# Patient Record
(demographics unavailable — no encounter records)

---

## 2025-01-28 NOTE — HISTORY OF PRESENT ILLNESS
[FreeTextEntry1] : Annual, [place]  Menses reg, thinking of starting a family in May. Discussed PNV, avoiding toxic habits.  Strong FH of ovarian and breast ca. Mother dx at 40 with recurrence. Mammo/sono ordered. Genetic testing ordered

## 2025-01-28 NOTE — PHYSICAL EXAM
[Chaperone Present] : A chaperone was present in the examining room during all aspects of the physical examination [[id number]] : A chaperone was present during the pelvic exam. [Appropriately responsive] : appropriately responsive [Alert] : alert [No Acute Distress] : no acute distress [No Lymphadenopathy] : no lymphadenopathy [Soft] : soft [Non-tender] : non-tender [Non-distended] : non-distended [No HSM] : No HSM [No Lesions] : no lesions [No Mass] : no mass [Oriented x3] : oriented x3 [Examination Of The Breasts] : a normal appearance [No Masses] : no breast masses were palpable [Labia Majora] : normal [Labia Minora] : normal [Normal] : normal [Uterine Adnexae] : normal

## 2025-07-16 NOTE — ASSESSMENT
[FreeTextEntry1] : 32yo F with PMHx of HLD, enlarged thyroid presents to the office for fertility concerns.   #URI - likely viral - supportive care - tea, honey, lozenges  #Fertility planning - Patient has been trying to conceive since 5/2025 - 3 months - Continue prenatal vitamins - Counseled on stress management, sleep hygiene  - Diet: Encouraged a balanced diet with portion control, emphasizing whole foods, lean proteins, fruits, and vegetables while minimizing processed foods. - Exercise: Recommended at least 150 minutes of moderate intensity aerobic exercise per week, along with strength training exercises at least twice weekly - Recommend follow up with GYN for fertility planning, genetic screening given GYN CA FHx   #Palpitations - Patient with occasional palpitations, especially with exertion - VS stable - EKG today with SR, vent rate 77 bpm, no acute ischemic changes  - Will check TSH, free T4 today - further workup pending results  - Counseled patient on hydration, balanced diet, regular exercise as tolerated  - Recommend seeing Cardiology to rule out cardiac etiology   - Follow up in 1 month for CPE  #h/o enlarged Thyroid - Will do TFTs

## 2025-07-16 NOTE — PHYSICAL EXAM
[TextEntry] : GENERAL: patient appears well-developed, well-groomed, well-nourished; no acute distress; appropriately interactive HEAD: normocephalic, atraumatic EYES: sclera clear and anicteric, no exudates, PERRLA ENMT: + mild oropharynx erythema, no exudates, moist mucous membranes; tympanic membrane non-bulging, non-erythematous, canal clear NECK: supple, soft, no lymphadenopathy, + thyroid enlarged   LUNGS: good air entry bilaterally, clear to auscultation, symmetric breath sounds, no wheezing, crackles, or rhonchi appreciated HEART: S1/S2 present, regular rate and rhythm, no murmurs noted, no lower extremity edema, 2+ distal pulses in upper and lower extremities bilaterally GASTROINTESTINAL: abdomen is soft, non-tender, non-distended, normoactive bowel sounds INTEGUMENT: good skin turgor, warm skin, appears well perfused, no lesions, no rashes MUSCULOSKELETAL: no clubbing or cyanosis, no obvious deformity NEUROLOGIC: awake, alert, oriented x3, good muscle tone in all 4 extremities, 5/5 motor strength in upper and lower extremities bilaterally, 5/5  strength, sensation intact in upper and lower extremities bilaterally PSYCHIATRIC: affect appropriate, mood congruent, thought process logical and linear HEME/LYMPH: no obvious ecchymosis or petechiae

## 2025-07-16 NOTE — HISTORY OF PRESENT ILLNESS
[de-identified] : 32yo F with PMHx of HLD, enlarged thyroid presents to the office for an Annual Physical Exam. Patient states she got  in 5/2025 and has been trying to get pregnant since; no pregnancy yet. Denies any Hx of prior pregnancies or miscarriages. Patient was previously on OCPs and discontinued in 2020. Reports regular intercourse with partner. Has been eating balanced diet. Does pilates/yoga/cycling about 2-3x a week. Patient has been stressed recently with her wedding, travel, and her mom's breast CA. She reports occasional fatigue even with consistent sleep; has trouble falling asleep; she did not end up getting sleep study, since she got tracker device [employer], feels like sleep has somewhat improved. Patient last saw her OBGYN Dr. Cummings in 1/2025; no acute concerns at that appointment. Patient has FHx of breast CA (mother diagnosed at 39yo, s/p RXT, lumpectomy x3; currently stage IV after tumor recurrence, now on chemo; living, 56yo), non-Hodgkins lymphoma (father diagnosed in his early 60s; living, 70yo); sister (living, 29yo) - healthy; maternal grandmother  (living, 81yo) - ovarian, CA, breast CA. She tried to schedule genetic testing but the practice stated they do not perform this type of genetic testing. Patient denies any known Hx of fertility issues. Patient reports Hx of enlarged thyroid; has had ultrasound in the past, all wnl per patient. Endorses occasional palpitations, unrelated to activity. Drinks coffee in the morning, 1 shot of espresso daily. Denies any known Hx of autoimmune conditions.   Patient feels like she is having a cold now; experiencing cold symptoms, including nasal congestion, rhinorrhea, cough for 4-5 days; sore throat a few days ago, but now resolved. Patient states her  had similar symptoms, which have now resolved. Patient states she felt worse this morning; has been hydrating and trying to eat. Denies fevers, chills, sweating, unintentional weight loss, loss of appetite, n/v/diarrhea. Recently travelled to [place], came home 6 days ago.   No other complaints at this time.   PMHx:   Surgical Hx:   Medications:   Allergies:   Specialists:   FHx:   SOCIAL Hx: Diet: Exercise: Tobacco: Alcohol: Recreational Drugs: Caffeine: Water intake: Sexually active: Hx of STIs? Occupation: Stressors:   HEALTHCARE MAINTENANCE: Last CPE:   Mammography: Pap Smear: LMP: Dexa:   Colonoscopy:   IMMUNIZATIONS: Flu: COVID-19: Pneumonia: Shingles: Tetanus:  [FreeTextEntry8] : 32yo F with PMHx of HLD, enlarged thyroid presents to the office for fertility concerns. Patient states she got  in 5/2025 and has been trying to get pregnant since; no pregnancy yet. Denies any Hx of prior pregnancies or miscarriages. Patient was previously on OCPs and discontinued in 2020. Reports regular intercourse with partner, regular periods, has been tracking ovulation with home kit and timing intercourse accordingly. Has been eating balanced diet. Does pilates/yoga/cycling about 2-3x a week. Patient has been stressed recently with her wedding, travel, and her mom's breast CA. She reports occasional fatigue even with consistent sleep; has trouble falling asleep; she did not end up getting sleep study, since she got tracker device [employer], feels like sleep has somewhat improved. Patient last saw her OBGYN Dr. Cummings in 1/2025; no acute concerns at that appointment. Patient has FHx of breast CA (mother diagnosed at 41yo, s/p RXT, lumpectomy x3; currently stage IV after tumor recurrence, now on chemo; living, 58yo), non-Hodgkins lymphoma (father diagnosed in his early 60s; living, 70yo); sister (living, 29yo) - healthy; maternal grandmother (living, 81yo) - ovarian, CA, breast CA. She tried to schedule genetic testing but the practice stated they do not perform this type of genetic testing. Patient denies any known Hx of fertility issues. Patient reports Hx of enlarged thyroid; has had ultrasound in the past, all wnl per patient. Endorses occasional palpitations, unrelated to activity. Drinks coffee in the morning, 1 shot of espresso daily. Denies any known Hx of autoimmune conditions.  Patient feels like she is having a cold now; experiencing cold symptoms, including nasal congestion, rhinorrhea, cough for 4-5 days; sore throat a few days ago, but now resolved. Patient states her  had similar symptoms, which have now resolved. Patient states she felt worse this morning; has been hydrating and trying to eat. Denies fevers, chills, sweating, unintentional weight loss, loss of appetite, n/v/diarrhea. Recently travelled to [place], came home 6 days ago.  No other complaints at this time.  22

## 2025-07-16 NOTE — ASSESSMENT
[FreeTextEntry1] : 34yo F with PMHx of HLD, enlarged thyroid presents to the office for fertility concerns.   #URI - likely viral - supportive care - tea, honey, lozenges  #Fertility planning - Patient has been trying to conceive since 5/2025 - 3 months - Continue prenatal vitamins - Counseled on stress management, sleep hygiene  - Diet: Encouraged a balanced diet with portion control, emphasizing whole foods, lean proteins, fruits, and vegetables while minimizing processed foods. - Exercise: Recommended at least 150 minutes of moderate intensity aerobic exercise per week, along with strength training exercises at least twice weekly - Recommend follow up with GYN for fertility planning, genetic screening given GYN CA FHx   #Palpitations - Patient with occasional palpitations, especially with exertion - VS stable - EKG today with SR, vent rate 77 bpm, no acute ischemic changes  - Will check TSH, free T4 today - further workup pending results  - Counseled patient on hydration, balanced diet, regular exercise as tolerated  - Recommend seeing Cardiology to rule out cardiac etiology   - Follow up in 1 month for CPE  #h/o enlarged Thyroid - Will do TFTs

## 2025-07-16 NOTE — HISTORY OF PRESENT ILLNESS
[de-identified] : 32yo F with PMHx of HLD, enlarged thyroid presents to the office for an Annual Physical Exam. Patient states she got  in 5/2025 and has been trying to get pregnant since; no pregnancy yet. Denies any Hx of prior pregnancies or miscarriages. Patient was previously on OCPs and discontinued in 2020. Reports regular intercourse with partner. Has been eating balanced diet. Does pilates/yoga/cycling about 2-3x a week. Patient has been stressed recently with her wedding, travel, and her mom's breast CA. She reports occasional fatigue even with consistent sleep; has trouble falling asleep; she did not end up getting sleep study, since she got tracker device [employer], feels like sleep has somewhat improved. Patient last saw her OBGYN Dr. Cummings in 1/2025; no acute concerns at that appointment. Patient has FHx of breast CA (mother diagnosed at 41yo, s/p RXT, lumpectomy x3; currently stage IV after tumor recurrence, now on chemo; living, 58yo), non-Hodgkins lymphoma (father diagnosed in his early 60s; living, 70yo); sister (living, 27yo) - healthy; maternal grandmother  (living, 79yo) - ovarian, CA, breast CA. She tried to schedule genetic testing but the practice stated they do not perform this type of genetic testing. Patient denies any known Hx of fertility issues. Patient reports Hx of enlarged thyroid; has had ultrasound in the past, all wnl per patient. Endorses occasional palpitations, unrelated to activity. Drinks coffee in the morning, 1 shot of espresso daily. Denies any known Hx of autoimmune conditions.   Patient feels like she is having a cold now; experiencing cold symptoms, including nasal congestion, rhinorrhea, cough for 4-5 days; sore throat a few days ago, but now resolved. Patient states her  had similar symptoms, which have now resolved. Patient states she felt worse this morning; has been hydrating and trying to eat. Denies fevers, chills, sweating, unintentional weight loss, loss of appetite, n/v/diarrhea. Recently travelled to [place], came home 6 days ago.   No other complaints at this time.   PMHx:   Surgical Hx:   Medications:   Allergies:   Specialists:   FHx:   SOCIAL Hx: Diet: Exercise: Tobacco: Alcohol: Recreational Drugs: Caffeine: Water intake: Sexually active: Hx of STIs? Occupation: Stressors:   HEALTHCARE MAINTENANCE: Last CPE:   Mammography: Pap Smear: LMP: Dexa:   Colonoscopy:   IMMUNIZATIONS: Flu: COVID-19: Pneumonia: Shingles: Tetanus:  [FreeTextEntry8] : 34yo F with PMHx of HLD, enlarged thyroid presents to the office for fertility concerns. Patient states she got  in 5/2025 and has been trying to get pregnant since; no pregnancy yet. Denies any Hx of prior pregnancies or miscarriages. Patient was previously on OCPs and discontinued in 2020. Reports regular intercourse with partner, regular periods, has been tracking ovulation with home kit and timing intercourse accordingly. Has been eating balanced diet. Does pilates/yoga/cycling about 2-3x a week. Patient has been stressed recently with her wedding, travel, and her mom's breast CA. She reports occasional fatigue even with consistent sleep; has trouble falling asleep; she did not end up getting sleep study, since she got tracker device [employer], feels like sleep has somewhat improved. Patient last saw her OBGYN Dr. Cummings in 1/2025; no acute concerns at that appointment. Patient has FHx of breast CA (mother diagnosed at 41yo, s/p RXT, lumpectomy x3; currently stage IV after tumor recurrence, now on chemo; living, 58yo), non-Hodgkins lymphoma (father diagnosed in his early 60s; living, 72yo); sister (living, 27yo) - healthy; maternal grandmother (living, 81yo) - ovarian, CA, breast CA. She tried to schedule genetic testing but the practice stated they do not perform this type of genetic testing. Patient denies any known Hx of fertility issues. Patient reports Hx of enlarged thyroid; has had ultrasound in the past, all wnl per patient. Endorses occasional palpitations, unrelated to activity. Drinks coffee in the morning, 1 shot of espresso daily. Denies any known Hx of autoimmune conditions.  Patient feels like she is having a cold now; experiencing cold symptoms, including nasal congestion, rhinorrhea, cough for 4-5 days; sore throat a few days ago, but now resolved. Patient states her  had similar symptoms, which have now resolved. Patient states she felt worse this morning; has been hydrating and trying to eat. Denies fevers, chills, sweating, unintentional weight loss, loss of appetite, n/v/diarrhea. Recently travelled to [place], came home 6 days ago.  No other complaints at this time.

## 2025-07-16 NOTE — HISTORY OF PRESENT ILLNESS
[de-identified] : 34yo F with PMHx of HLD, enlarged thyroid presents to the office for an Annual Physical Exam. Patient states she got  in 5/2025 and has been trying to get pregnant since; no pregnancy yet. Denies any Hx of prior pregnancies or miscarriages. Patient was previously on OCPs and discontinued in 2020. Reports regular intercourse with partner. Has been eating balanced diet. Does pilates/yoga/cycling about 2-3x a week. Patient has been stressed recently with her wedding, travel, and her mom's breast CA. She reports occasional fatigue even with consistent sleep; has trouble falling asleep; she did not end up getting sleep study, since she got tracker device [employer], feels like sleep has somewhat improved. Patient last saw her OBGYN Dr. Cummings in 1/2025; no acute concerns at that appointment. Patient has FHx of breast CA (mother diagnosed at 39yo, s/p RXT, lumpectomy x3; currently stage IV after tumor recurrence, now on chemo; living, 58yo), non-Hodgkins lymphoma (father diagnosed in his early 60s; living, 70yo); sister (living, 27yo) - healthy; maternal grandmother  (living, 79yo) - ovarian, CA, breast CA. She tried to schedule genetic testing but the practice stated they do not perform this type of genetic testing. Patient denies any known Hx of fertility issues. Patient reports Hx of enlarged thyroid; has had ultrasound in the past, all wnl per patient. Endorses occasional palpitations, unrelated to activity. Drinks coffee in the morning, 1 shot of espresso daily. Denies any known Hx of autoimmune conditions.   Patient feels like she is having a cold now; experiencing cold symptoms, including nasal congestion, rhinorrhea, cough for 4-5 days; sore throat a few days ago, but now resolved. Patient states her  had similar symptoms, which have now resolved. Patient states she felt worse this morning; has been hydrating and trying to eat. Denies fevers, chills, sweating, unintentional weight loss, loss of appetite, n/v/diarrhea. Recently travelled to [place], came home 6 days ago.   No other complaints at this time.   PMHx:   Surgical Hx:   Medications:   Allergies:   Specialists:   FHx:   SOCIAL Hx: Diet: Exercise: Tobacco: Alcohol: Recreational Drugs: Caffeine: Water intake: Sexually active: Hx of STIs? Occupation: Stressors:   HEALTHCARE MAINTENANCE: Last CPE:   Mammography: Pap Smear: LMP: Dexa:   Colonoscopy:   IMMUNIZATIONS: Flu: COVID-19: Pneumonia: Shingles: Tetanus:  [FreeTextEntry8] : 32yo F with PMHx of HLD, enlarged thyroid presents to the office for fertility concerns. Patient states she got  in 5/2025 and has been trying to get pregnant since; no pregnancy yet. Denies any Hx of prior pregnancies or miscarriages. Patient was previously on OCPs and discontinued in 2020. Reports regular intercourse with partner, regular periods, has been tracking ovulation with home kit and timing intercourse accordingly. Has been eating balanced diet. Does pilates/yoga/cycling about 2-3x a week. Patient has been stressed recently with her wedding, travel, and her mom's breast CA. She reports occasional fatigue even with consistent sleep; has trouble falling asleep; she did not end up getting sleep study, since she got tracker device [employer], feels like sleep has somewhat improved. Patient last saw her OBGYN Dr. Cummings in 1/2025; no acute concerns at that appointment. Patient has FHx of breast CA (mother diagnosed at 39yo, s/p RXT, lumpectomy x3; currently stage IV after tumor recurrence, now on chemo; living, 58yo), non-Hodgkins lymphoma (father diagnosed in his early 60s; living, 70yo); sister (living, 29yo) - healthy; maternal grandmother (living, 79yo) - ovarian, CA, breast CA. She tried to schedule genetic testing but the practice stated they do not perform this type of genetic testing. Patient denies any known Hx of fertility issues. Patient reports Hx of enlarged thyroid; has had ultrasound in the past, all wnl per patient. Endorses occasional palpitations, unrelated to activity. Drinks coffee in the morning, 1 shot of espresso daily. Denies any known Hx of autoimmune conditions.  Patient feels like she is having a cold now; experiencing cold symptoms, including nasal congestion, rhinorrhea, cough for 4-5 days; sore throat a few days ago, but now resolved. Patient states her  had similar symptoms, which have now resolved. Patient states she felt worse this morning; has been hydrating and trying to eat. Denies fevers, chills, sweating, unintentional weight loss, loss of appetite, n/v/diarrhea. Recently travelled to [place], came home 6 days ago.  No other complaints at this time.

## 2025-07-16 NOTE — HISTORY OF PRESENT ILLNESS
[FreeTextEntry1] : HPI: Patient is a 33-year-old  female who has symptoms of palpitation for the past 2 years.  Patient's palpitations are nonexertional random episodes without any associated nausea vomiting diaphoresis or chest pain.  There are no definite precipitating or relieving factors for palpitations.  Patient denies any syncope.  Patient had a normal TSH done July 15, 2025.  Patient denies fever or chills.  Patient denies orthopnea PND or leg edema.     PMH: No known medical problems.  Social History: Works as a school counselor.  Non-smoker denies any alcohol or substance abuse.  Family history: Noncontributory.

## 2025-07-16 NOTE — REVIEW OF SYSTEMS
[TextEntry] : CONSTITUTIONAL: + occasional fatigue; denies fever, chills, weakness, significant weight loss/gain HEENT: denies vision changes, sore throat CARDIOVASCULAR: + palpitations; denies chest pain, chest pressure  RESPIRATORY: + cough; denies shortness of breath, sputum production GASTROINTESTINAL: denies nausea, vomiting, diarrhea, constipation, abdominal pain, melena or hematochezia GENITOURINARY: denies dysuria, hematuria, discharge NEUROLOGICAL: denies numbness, tingling, headache, focal weakness  INTEGUMENTARY: denies new lesions, rashes MUSCULOSKELETAL: denies new joint pain, muscle aches HEMATOLOGIC: denies gross bleeding, bruising

## 2025-07-16 NOTE — PHYSICAL EXAM

## 2025-07-16 NOTE — ASSESSMENT
[FreeTextEntry1] : EKG: July 16, 2025: Normal sinus rhythm.  Normal ECG.  Assessment: 1.  Palpitations.  Recommendations: 1.  Holter monitor for 14 days 2.  Echocardiogram 3.  Follow-up in a month with me  HOLTER MONITOR PLACED IN THE OFFICE TODAY.